# Patient Record
Sex: MALE | Race: WHITE | Employment: FULL TIME | URBAN - METROPOLITAN AREA
[De-identification: names, ages, dates, MRNs, and addresses within clinical notes are randomized per-mention and may not be internally consistent; named-entity substitution may affect disease eponyms.]

---

## 2017-05-10 ENCOUNTER — OFFICE VISIT (OUTPATIENT)
Dept: INTERNAL MEDICINE CLINIC | Age: 32
End: 2017-05-10

## 2017-05-10 VITALS
DIASTOLIC BLOOD PRESSURE: 84 MMHG | OXYGEN SATURATION: 98 % | HEART RATE: 82 BPM | SYSTOLIC BLOOD PRESSURE: 126 MMHG | TEMPERATURE: 98.4 F

## 2017-05-10 DIAGNOSIS — Z11.4 SCREENING FOR HIV WITHOUT PRESENCE OF RISK FACTORS: ICD-10-CM

## 2017-05-10 DIAGNOSIS — R07.9 CHEST PAIN, UNSPECIFIED TYPE: Primary | ICD-10-CM

## 2017-05-10 DIAGNOSIS — R07.9 CHEST PAIN, UNSPECIFIED TYPE: ICD-10-CM

## 2017-05-10 LAB
A/G RATIO: 2 (ref 1.1–2.2)
ALBUMIN SERPL-MCNC: 4.9 G/DL (ref 3.4–5)
ALP BLD-CCNC: 72 U/L (ref 40–129)
ALT SERPL-CCNC: 27 U/L (ref 10–40)
ANION GAP SERPL CALCULATED.3IONS-SCNC: 14 MMOL/L (ref 3–16)
AST SERPL-CCNC: 22 U/L (ref 15–37)
BILIRUB SERPL-MCNC: 0.6 MG/DL (ref 0–1)
BUN BLDV-MCNC: 15 MG/DL (ref 7–20)
CALCIUM SERPL-MCNC: 9.3 MG/DL (ref 8.3–10.6)
CHLORIDE BLD-SCNC: 98 MMOL/L (ref 99–110)
CHOLESTEROL, TOTAL: 233 MG/DL (ref 0–199)
CK MB: <1 NG/ML
CO2: 26 MMOL/L (ref 21–32)
CREAT SERPL-MCNC: 0.7 MG/DL (ref 0.9–1.3)
GFR AFRICAN AMERICAN: >60
GFR NON-AFRICAN AMERICAN: >60
GLOBULIN: 2.4 G/DL
GLUCOSE FASTING: 86 MG/DL (ref 70–99)
HCT VFR BLD CALC: 47.5 % (ref 40.5–52.5)
HDLC SERPL-MCNC: 50 MG/DL (ref 40–60)
HEMOGLOBIN: 15.5 G/DL (ref 13.5–17.5)
LDL CHOLESTEROL CALCULATED: 157 MG/DL
MCH RBC QN AUTO: 28.9 PG (ref 26–34)
MCHC RBC AUTO-ENTMCNC: 32.7 G/DL (ref 31–36)
MCV RBC AUTO: 88.5 FL (ref 80–100)
PDW BLD-RTO: 13.6 % (ref 12.4–15.4)
PLATELET # BLD: 187 K/UL (ref 135–450)
PMV BLD AUTO: 8.2 FL (ref 5–10.5)
POTASSIUM SERPL-SCNC: 4.5 MMOL/L (ref 3.5–5.1)
RBC # BLD: 5.37 M/UL (ref 4.2–5.9)
SODIUM BLD-SCNC: 138 MMOL/L (ref 136–145)
T4 FREE: 1.2 NG/DL (ref 0.9–1.8)
TOTAL PROTEIN: 7.3 G/DL (ref 6.4–8.2)
TRIGL SERPL-MCNC: 130 MG/DL (ref 0–150)
TSH REFLEX: 1.85 UIU/ML (ref 0.27–4.2)
VLDLC SERPL CALC-MCNC: 26 MG/DL
WBC # BLD: 4.5 K/UL (ref 4–11)

## 2017-05-10 PROCEDURE — 93000 ELECTROCARDIOGRAM COMPLETE: CPT | Performed by: NURSE PRACTITIONER

## 2017-05-10 PROCEDURE — 99203 OFFICE O/P NEW LOW 30 MIN: CPT | Performed by: NURSE PRACTITIONER

## 2017-05-10 ASSESSMENT — ENCOUNTER SYMPTOMS
SHORTNESS OF BREATH: 0
NAUSEA: 0
COUGH: 0
VOMITING: 0

## 2017-05-11 LAB
ESTIMATED AVERAGE GLUCOSE: 102.5 MG/DL
HBA1C MFR BLD: 5.2 %

## 2017-05-12 LAB — HIV-1 AND HIV-2 ANTIBODIES: NEGATIVE

## 2017-05-17 ENCOUNTER — HOSPITAL ENCOUNTER (OUTPATIENT)
Dept: NON INVASIVE DIAGNOSTICS | Age: 32
Discharge: HOME | End: 2017-05-17
Attending: NURSE PRACTITIONER | Admitting: NURSE PRACTITIONER

## 2017-05-17 DIAGNOSIS — R07.9 CHEST PAIN: ICD-10-CM

## 2017-11-15 ENCOUNTER — OFFICE VISIT (OUTPATIENT)
Dept: FAMILY MEDICINE CLINIC | Age: 32
End: 2017-11-15

## 2017-11-15 VITALS
HEART RATE: 67 BPM | RESPIRATION RATE: 20 BRPM | OXYGEN SATURATION: 98 % | TEMPERATURE: 98.6 F | DIASTOLIC BLOOD PRESSURE: 86 MMHG | HEIGHT: 72 IN | WEIGHT: 235.2 LBS | BODY MASS INDEX: 31.86 KG/M2 | SYSTOLIC BLOOD PRESSURE: 138 MMHG

## 2017-11-15 DIAGNOSIS — M54.2 NECK PAIN: Primary | ICD-10-CM

## 2017-11-15 DIAGNOSIS — E78.5 SERUM LIPIDS HIGH: ICD-10-CM

## 2017-11-15 DIAGNOSIS — Z23 NEED FOR PROPHYLACTIC VACCINATION AGAINST DIPHTHERIA-TETANUS-PERTUSSIS (DTP): ICD-10-CM

## 2017-11-15 PROCEDURE — 90471 IMMUNIZATION ADMIN: CPT | Performed by: FAMILY MEDICINE

## 2017-11-15 PROCEDURE — 99214 OFFICE O/P EST MOD 30 MIN: CPT | Performed by: FAMILY MEDICINE

## 2017-11-15 PROCEDURE — 90715 TDAP VACCINE 7 YRS/> IM: CPT | Performed by: FAMILY MEDICINE

## 2017-11-15 RX ORDER — DICLOFENAC SODIUM 75 MG/1
75 TABLET, DELAYED RELEASE ORAL 2 TIMES DAILY
Qty: 30 TABLET | Refills: 0 | Status: SHIPPED | OUTPATIENT
Start: 2017-11-15 | End: 2022-03-05 | Stop reason: ALTCHOICE

## 2017-11-15 RX ORDER — CYCLOBENZAPRINE HCL 10 MG
10 TABLET ORAL NIGHTLY PRN
Qty: 15 TABLET | Refills: 0 | Status: SHIPPED | OUTPATIENT
Start: 2017-11-15 | End: 2022-03-05 | Stop reason: ALTCHOICE

## 2017-11-15 ASSESSMENT — ENCOUNTER SYMPTOMS
ALLERGIC/IMMUNOLOGIC NEGATIVE: 1
SHORTNESS OF BREATH: 0
ABDOMINAL PAIN: 0
RESPIRATORY NEGATIVE: 1
CHEST TIGHTNESS: 0
COUGH: 0
EYES NEGATIVE: 1

## 2017-11-15 ASSESSMENT — PATIENT HEALTH QUESTIONNAIRE - PHQ9
SUM OF ALL RESPONSES TO PHQ9 QUESTIONS 1 & 2: 0
1. LITTLE INTEREST OR PLEASURE IN DOING THINGS: 0
2. FEELING DOWN, DEPRESSED OR HOPELESS: 0
SUM OF ALL RESPONSES TO PHQ QUESTIONS 1-9: 0

## 2017-11-15 NOTE — PROGRESS NOTES
Respiratory: Negative. Negative for cough, chest tightness and shortness of breath. Cardiovascular: Positive for chest pain. Negative for palpitations and leg swelling. Chest pain  8 month ago  Seen @[place] office  EKG done  Stress test done @[place]   Gastrointestinal: Negative for abdominal pain. Endocrine: Negative. Genitourinary: Negative for flank pain and frequency. Musculoskeletal: Positive for neck pain. Allergic/Immunologic: Negative. Neurological: Negative. Negative for dizziness, light-headedness, numbness and headaches. Hematological: Negative. Psychiatric/Behavioral: Negative. Objective:   Physical Exam   Constitutional: He is oriented to person, place, and time. He appears well-developed and well-nourished. No distress. HENT:   Head: Normocephalic. Right Ear: External ear normal.   Left Ear: External ear normal.   Nose: Nose normal.   Mouth/Throat: Oropharynx is clear and moist.   Eyes: Conjunctivae and EOM are normal. Pupils are equal, round, and reactive to light. Neck: Normal range of motion. Neck supple. Cardiovascular: Normal rate, regular rhythm and normal heart sounds. No murmur heard. Pulmonary/Chest: Effort normal and breath sounds normal.   Musculoskeletal: He exhibits no edema, tenderness or deformity. Neck +FROM  DTR 1/4    Neurological: He is alert and oriented to person, place, and time. He has normal reflexes. Skin: Skin is warm. He is not diaphoretic. Psychiatric: He has a normal mood and affect. His behavior is normal. Judgment and thought content normal.   Vitals reviewed. /86 (Site: Right Arm, Position: Sitting, Cuff Size: Medium Adult)   Pulse 67   Temp 98.6 °F (37 °C) (Oral)   Resp 20   Ht 5' 11.75\" (1.822 m)   Wt 235 lb 3.2 oz (106.7 kg)   SpO2 98%   BMI 32.12 kg/m²     Assessment:      1. Neck pain  diclofenac (VOLTAREN) 75 MG EC tablet    cyclobenzaprine (FLEXERIL) 10 MG tablet   2.  Need for prophylactic vaccination against diphtheria-tetanus-pertussis (DTP)  Tdap (age 10y-63y) IM (ADACEL)    DISCONTINUED: Tdap (ADACEL) 5-2-15.5 LF-MCG/0.5 injection   3.  Serum lipids high  Lipid Panel           Plan:      Rx given  Consider Physical therapy   He might get Chiropractor  Hx high lipid new order for repeat

## 2019-04-25 ENCOUNTER — TELEPHONE (OUTPATIENT)
Dept: INTERNAL MEDICINE CLINIC | Age: 34
End: 2019-04-25

## 2019-04-25 NOTE — TELEPHONE ENCOUNTER
Jaylyn Orlando is asking for the pt's EKG from 5/10/17  He has a 3:30 appt there today  Fax: [phone]

## 2022-01-09 ENCOUNTER — HOSPITAL ENCOUNTER (EMERGENCY)
Age: 37
Discharge: HOME | End: 2022-01-10
Attending: EMERGENCY MEDICINE
Payer: COMMERCIAL

## 2022-01-09 ENCOUNTER — APPOINTMENT (OUTPATIENT)
Dept: GENERAL RADIOLOGY | Age: 37
End: 2022-01-09
Payer: COMMERCIAL

## 2022-01-09 VITALS
TEMPERATURE: 98.4 F | RESPIRATION RATE: 16 BRPM | SYSTOLIC BLOOD PRESSURE: 158 MMHG | OXYGEN SATURATION: 98 % | HEART RATE: 68 BPM | DIASTOLIC BLOOD PRESSURE: 103 MMHG

## 2022-01-09 DIAGNOSIS — R07.9 CHEST PAIN, UNSPECIFIED TYPE: Primary | ICD-10-CM

## 2022-01-09 LAB
A/G RATIO: 1.4 (ref 1.1–2.2)
ALBUMIN SERPL-MCNC: 4.2 G/DL (ref 3.4–5)
ALP BLD-CCNC: 63 U/L (ref 40–129)
ALT SERPL-CCNC: 26 U/L (ref 10–40)
ANION GAP SERPL CALCULATED.3IONS-SCNC: 14 MMOL/L (ref 3–16)
AST SERPL-CCNC: 25 U/L (ref 15–37)
BASOPHILS ABSOLUTE: 0 K/UL (ref 0–0.2)
BASOPHILS RELATIVE PERCENT: 0.5 %
BILIRUB SERPL-MCNC: <0.2 MG/DL (ref 0–1)
BUN BLDV-MCNC: 16 MG/DL (ref 7–20)
CALCIUM SERPL-MCNC: 9.3 MG/DL (ref 8.3–10.6)
CHLORIDE BLD-SCNC: 102 MMOL/L (ref 99–110)
CO2: 20 MMOL/L (ref 21–32)
CREAT SERPL-MCNC: 0.7 MG/DL (ref 0.9–1.3)
EOSINOPHILS ABSOLUTE: 0.3 K/UL (ref 0–0.6)
EOSINOPHILS RELATIVE PERCENT: 4.1 %
GFR AFRICAN AMERICAN: >60
GFR NON-AFRICAN AMERICAN: >60
GLUCOSE BLD-MCNC: 84 MG/DL (ref 70–99)
HCT VFR BLD CALC: 45.1 % (ref 40.5–52.5)
HEMOGLOBIN: 15.3 G/DL (ref 13.5–17.5)
LYMPHOCYTES ABSOLUTE: 2.7 K/UL (ref 1–5.1)
LYMPHOCYTES RELATIVE PERCENT: 36.9 %
MCH RBC QN AUTO: 29.3 PG (ref 26–34)
MCHC RBC AUTO-ENTMCNC: 33.8 G/DL (ref 31–36)
MCV RBC AUTO: 86.7 FL (ref 80–100)
MONOCYTES ABSOLUTE: 0.7 K/UL (ref 0–1.3)
MONOCYTES RELATIVE PERCENT: 9.7 %
NEUTROPHILS ABSOLUTE: 3.6 K/UL (ref 1.7–7.7)
NEUTROPHILS RELATIVE PERCENT: 48.8 %
PDW BLD-RTO: 13 % (ref 12.4–15.4)
PLATELET # BLD: 223 K/UL (ref 135–450)
PMV BLD AUTO: 7.7 FL (ref 5–10.5)
POTASSIUM SERPL-SCNC: 4.5 MMOL/L (ref 3.5–5.1)
PRO-BNP: 15 PG/ML (ref 0–124)
RBC # BLD: 5.2 M/UL (ref 4.2–5.9)
SODIUM BLD-SCNC: 136 MMOL/L (ref 136–145)
TOTAL PROTEIN: 7.1 G/DL (ref 6.4–8.2)
TROPONIN: <0.01 NG/ML
TROPONIN: <0.01 NG/ML
WBC # BLD: 7.4 K/UL (ref 4–11)

## 2022-01-09 PROCEDURE — 36415 COLL VENOUS BLD VENIPUNCTURE: CPT

## 2022-01-09 PROCEDURE — 84484 ASSAY OF TROPONIN QUANT: CPT

## 2022-01-09 PROCEDURE — 93005 ELECTROCARDIOGRAM TRACING: CPT | Performed by: NURSE PRACTITIONER

## 2022-01-09 PROCEDURE — 85025 COMPLETE CBC W/AUTO DIFF WBC: CPT

## 2022-01-09 PROCEDURE — 80053 COMPREHEN METABOLIC PANEL: CPT

## 2022-01-09 PROCEDURE — 99285 EMERGENCY DEPT VISIT HI MDM: CPT

## 2022-01-09 PROCEDURE — 83880 ASSAY OF NATRIURETIC PEPTIDE: CPT

## 2022-01-09 PROCEDURE — 6370000000 HC RX 637 (ALT 250 FOR IP): Performed by: NURSE PRACTITIONER

## 2022-01-09 PROCEDURE — 71045 X-RAY EXAM CHEST 1 VIEW: CPT

## 2022-01-09 PROCEDURE — 93005 ELECTROCARDIOGRAM TRACING: CPT | Performed by: EMERGENCY MEDICINE

## 2022-01-09 RX ORDER — LISINOPRIL 10 MG/1
20 TABLET ORAL ONCE
Status: COMPLETED | OUTPATIENT
Start: 2022-01-09 | End: 2022-01-09

## 2022-01-09 RX ORDER — NITROGLYCERIN 0.4 MG/1
0.4 TABLET SUBLINGUAL ONCE
Status: COMPLETED | OUTPATIENT
Start: 2022-01-09 | End: 2022-01-09

## 2022-01-09 RX ADMIN — LIDOCAINE HYDROCHLORIDE: 20 SOLUTION ORAL; TOPICAL at 20:28

## 2022-01-09 RX ADMIN — NITROGLYCERIN 0.4 MG: 0.4 TABLET SUBLINGUAL at 19:38

## 2022-01-09 RX ADMIN — LISINOPRIL 20 MG: 10 TABLET ORAL at 19:38

## 2022-01-09 ASSESSMENT — ENCOUNTER SYMPTOMS
NAUSEA: 0
VOMITING: 0
ABDOMINAL PAIN: 0
SHORTNESS OF BREATH: 0
DIARRHEA: 0
CHEST TIGHTNESS: 0

## 2022-01-09 ASSESSMENT — PAIN SCALES - GENERAL
PAINLEVEL_OUTOF10: 5
PAINLEVEL_OUTOF10: 2

## 2022-01-09 ASSESSMENT — HEART SCORE: ECG: 0

## 2022-01-09 NOTE — LETTER
[facility] Emergency Department  [address] [facility], [address]             January 9, 2022    Patient: Marsha Valiente   YOB: 1985   Date of Visit: 1/9/2022       To Whom It May Concern:    Marsha Valiente was seen and treated in our emergency department on 1/9/2022. He may return to work on 1/12/2022.       Sincerely,         [facility] Emergency Room

## 2022-01-10 LAB
EKG ATRIAL RATE: 67 BPM
EKG ATRIAL RATE: 95 BPM
EKG DIAGNOSIS: NORMAL
EKG DIAGNOSIS: NORMAL
EKG P AXIS: 38 DEGREES
EKG P AXIS: 51 DEGREES
EKG P-R INTERVAL: 150 MS
EKG P-R INTERVAL: 154 MS
EKG Q-T INTERVAL: 358 MS
EKG Q-T INTERVAL: 392 MS
EKG QRS DURATION: 88 MS
EKG QRS DURATION: 92 MS
EKG QTC CALCULATION (BAZETT): 414 MS
EKG QTC CALCULATION (BAZETT): 449 MS
EKG R AXIS: 26 DEGREES
EKG R AXIS: 31 DEGREES
EKG T AXIS: 27 DEGREES
EKG T AXIS: 33 DEGREES
EKG VENTRICULAR RATE: 67 BPM
EKG VENTRICULAR RATE: 95 BPM

## 2022-01-10 PROCEDURE — 93010 ELECTROCARDIOGRAM REPORT: CPT | Performed by: INTERNAL MEDICINE

## 2022-01-10 NOTE — ED PROVIDER NOTES
This is an independent LAUREN patient encounter. I am available for consultation if needed. I did not perform a face-to-face evaluation of this patient and was not asked to see the patient. I was not made aware of any details of the patient's H&P or medical decision making but was asked to review and document this EKG. See my interpretation of the EKG below. I shared my findings and interpretation with the LAUREN for use in his/her independent management of this patient. See his/her note for details of the patient's history, physical, and all medical decision making.       The 12 lead EKG was interpreted by me as follows:  Rate: normal with a rate of 95  Rhythm: sinus  Axis: normal  Intervals: normal IN, narrow QRS, normal QTc  ST segments: no ST elevations or depressions  T waves: no abnormal inversions  Non-specific T wave changes: not present  Prior EKG comparison: No prior is currently available for comparison          Simona Swain MD  01/09/22 3596

## 2022-01-10 NOTE — ED NOTES
PT in bed resting, breathing easy, non-labored. No distress noted. On bedside tele monitoring.       Mary Roth RN  01/09/22 0930

## 2022-01-10 NOTE — ED NOTES
Pt cleared for discharge. Pt given all discharge and follow-up instructions verbally and in writing. Pt verbalized understanding of all instructions. Pt left care area without incident.       Lyda Schirmer, RN  01/09/22 6642

## 2022-01-10 NOTE — ED NOTES
Bed: 09  Expected date:   Expected time:   Means of arrival:   Comments:  meena Hunt RN  01/09/22 1932

## 2022-01-10 NOTE — ED PROVIDER NOTES
[facility]    Physician Attestation    Pt Name: Benedict Ham  MRN: [id number]  Armstrongfjuan 1985  Date of evaluation: 1/9/22        Physician Note:    I havepersonally performed and/or participated in the history, exam and medical decision making and agree with all pertinent clinical information. I have also reviewed and agree with the past medical, family and social historyunless otherwise noted. I have personally performed a face to face diagnostic evaluation onthis patient. I have reviewed the mid-levels findings and agree. History: Chest pressure right parasternal most of the day afternoon till seen in the ER and given nitroglycerin no radiation no associated dyspnea diaphoresis      REVIEW OF SYSTEMS    Constitutional:  Denies fever, chills, or weakness   Eyes:  Denies vision changes  HENT:  Denies sore throat or neck pain   Respiratory:  Denies cough or shortness of breath   Cardiovascular:  chest pain  GI:  Denies abdominal pain, nausea, vomiting, or diarrhea   Musculoskeletal:  Denies back pain   Skin: no rash or vesicles   Neurologic:  no headache weakness focal    Lymphatic:  no swollen  nodes   Psychiatric: no si or hs thoughts     All systems negative except as marked. General Appearance:  Alert, cooperative, no distress, appears stated age. Head:  Normocephalic, without obvious abnormality, atraumatic. Eyes:  conjunctiva/corneas clear, EOM's intact. Sclera anicteric. ENT: Mucous membranes moist.   Neck: Supple, symmetrical, trachea midline, no adenopathy. No jugular venous distention. Lungs:   No Respiratory Distress. no rales  rhonchi rub   Chest Wall:  Nontender  no deformity   Heart:  Rsr no murmer gallop    Abdomen:   Soft nontender no organomegally    Extremities:  Full range of motion. no deformity   Pulses: Equal  upper and lower    Skin:  No rashes or lesions to exposed skin.    Neurologic: Alert and oriented X 3. Motor grossly normal.  Speech clear. Cr n 2-12 intact   EKG Interpretation    Interpreted by emergency department physician    Rhythm: normal sinus   Rate: normal  Axis: normal  Ectopy: none  Conduction: normal  ST Segments: no acute change  T Waves: no acute change  Q Waves: none    Clinical Impression: Normal sinus rhythm    Boris Tesfaye MD  EKG Interpretation    Interpreted by emergency department physician    Rhythm: normal sinus   Rate: normal  Axis: normal  Ectopy: none  Conduction: normal  ST Segments: no acute change  T Waves: no acute change  Q Waves: none and aVf    Clinical Impression: Sinus rhythm  Boris Tesfaye MD  Heart score of 3 patient is pain-free    1. Chest pain, unspecified type          DISPOSITION/PLAN  PATIENT REFERRED TO:  Claudia Fitzgerald MD  [address] [place] [address],[address] [id number] [phone]    Schedule an appointment as soon as possible for a visit   call in the morning for follow up appointment    Kay Romero MD  [address]  [address] [place] [address]          DISCHARGE MEDICATIONS:  Discharge Medication List as of 1/9/2022 11:37 PM            MD Boris Quintero MD  01/10/22 7252

## 2022-01-12 NOTE — PROGRESS NOTES
[address]   Cardiac Consultation    Referring Provider:  Russ Taveras MD     Chief Complaint   Patient presents with    Chest Pain    New Patient      History of Present Illness:  Mr. Harlene Meigs is here after an ER visit 22 for chest pain. He has had pain in the past with a negative stress test. This occasion, his B/P was elevated; he took 2 adult asa. He was due for his Lisinopril at the time. He was given NTG with minimal relief followed by a GI cocktail. EKG stable and troponins were negative. He was discharged in stable condition with an order for a stress test, not scheduled yet. He is a former smoker. He is treated for hypertension and hyperlipidemia. He's had both Covid vaccines, no booster yet. His father had a heart attack age 28, and his grandfather has heart disease. His mother  in her 42's from CHF    Today, he reports upper chest pain that feels like \"something digging in my chest\"; sometimes there is a burning sensation. He has no symptoms with exertional efforts. He denies VANN/PND, palpitations, light-headedness, edema. He quit smoking 3-4 years ago. Past Medical History:  hyperlipidemia    Surgical History:   has no past surgical history on file. Social History:   reports that he quit smoking about 7 years ago. He has a 3.50 pack-year smoking history. He has never used smokeless tobacco. He reports current alcohol use. He reports that he does not use drugs. Family History:  family history includes Heart Attack in his father; Other in his maternal grandfather, mother, and paternal grandfather. Home Medications:  Prior to Admission medications    Medication Sig Start Date End Date Taking?  Authorizing Provider   rosuvastatin (CRESTOR) 10 MG tablet Take 10 mg by mouth daily 21  Yes Historical Provider, MD   lisinopril (PRINIVIL;ZESTRIL) 20 MG tablet Take 20 mg by mouth daily 21  Yes Historical Provider, MD   diclofenac (VOLTAREN) 75 MG EC tablet Take 1 tablet by mouth 2 times daily 11/15/17  Yes Lars Meneses MD   cyclobenzaprine (FLEXERIL) 10 MG tablet Take 1 tablet by mouth nightly as needed for Muscle spasms 11/15/17  Yes Lars Meneses MD        Allergies:  Patient has no known allergies. Review of Systems:   · Constitutional: there has been no unanticipated weight loss. There's been no change in energy level, sleep pattern, or activity level. · Eyes: No visual changes or diplopia. No scleral icterus. · ENT: No Headaches, hearing loss or vertigo. No mouth sores or sore throat. · Cardiovascular: Reviewed in HPI  · Respiratory: No cough or wheezing, no sputum production. No hematemesis. · Gastrointestinal: No abdominal pain, appetite loss, blood in stools. No change in bowel or bladder habits. · Genitourinary: No dysuria, trouble voiding, or hematuria. · Musculoskeletal:  No gait disturbance, weakness or joint complaints. · Integumentary: No rash or pruritis. · Neurological: No headache, diplopia, change in muscle strength, numbness or tingling. No change in gait, balance, coordination, mood, affect, memory, mentation, behavior. · Psychiatric: No anxiety, no depression. · Endocrine: No malaise, fatigue or temperature intolerance. No excessive thirst, fluid intake, or urination. No tremor. · Hematologic/Lymphatic: No abnormal bruising or bleeding, blood clots or swollen lymph nodes. · Allergic/Immunologic: No nasal congestion or hives.     Physical Examination:    Vitals:    01/17/22 1354   BP: 128/82   Pulse: 81   SpO2: 98%        Wt Readings from Last 1 Encounters:   01/17/22 233 lb 9.6 oz (106 kg)     Constitutional and General Appearance: Appears stated age, NAD  Skin:good turgor,intact without lesions  HEENT: EOMI ,normal  Neck:no JVD    Respiratory:  · Normal excursion and expansion without use of accessory muscles  · Resp Auscultation: Normal breath sounds without dullness  Cardiovascular:  · The apical impulses not displaced  · Heart tones are crisp and normal  · Cervical veins are not engorged  · The carotid upstroke is normal in amplitude and contour without delay or bruit  · Peripheral pulses are symmetrical and full  · There is no clubbing, cyanosis of the extremities. · No edema  · Femoral Arteries: 2+ and equal  · Pedal Pulses: 2+ and equal   Abdomen:  · No masses or tenderness  · Liver/Spleen: No Abnormalities Noted  Neurological/Psychiatric:  · Alert and oriented in all spheres  · Moves all extremities well  · Exhibits normal gait balance and coordination  · No abnormalities of mood, affect, memory, mentation, or behavior are noted    Plain GXT 2017  Summary   Normal stress EKG. Assessment:       Chest pain, atypical  No exacerbating factors. B/P elevated at the time> was due for B/P med. Seen in ER 1/9/22> given NTG & GI cocktail. [address] heart attack in father age 28. EKG 1/9/21 (ER)> NSR 67  EKG today 1/17/22 (read & interpreted by me)> NSR 75, LA enlargement  Plain GXT today 1/17/22 was normal  Can try Advil 600mg bid x 2 weeks    Hypertension  Stable on Lisinopril  Blood pressure 128/82, pulse 81, height 6' (1.829 m), weight 233 lb 9.6 oz (106 kg), SpO2 98 %. Hyperlipidemia  Managed per PCP  Stable on rosuvastatin  2/15/21> , , HDL 44, LDL 88      PLAN:  1. Take Advil or ibuprofen 600mg bid x 2 weeks with food to aid chest discomfort  2. Coronary calcium score soon (FF or Proscan)  3. Return for follow up in 2 months    I appreciate the opportunity of cooperating in the care of this individual.    Henrietta Lewis M.D., [facility]    Patient's problem list, medications, allergies, past medical, surgical, social and family histories were reviewed and updated as appropriate. Scribe's attestation: This note was scribed in the presence of Dr. Sony Lewis MD, by Brigitte Wolff RN. The scribe's documentation has been prepared under my direction and personally reviewed by me in its entirety.  I confirm that the note above accurately

## 2022-01-17 ENCOUNTER — PROCEDURE VISIT (OUTPATIENT)
Dept: CARDIOLOGY CLINIC | Age: 37
End: 2022-01-17
Payer: COMMERCIAL

## 2022-01-17 ENCOUNTER — OFFICE VISIT (OUTPATIENT)
Dept: CARDIOLOGY CLINIC | Age: 37
End: 2022-01-17
Payer: COMMERCIAL

## 2022-01-17 VITALS
BODY MASS INDEX: 31.64 KG/M2 | HEIGHT: 72 IN | WEIGHT: 233.6 LBS | HEART RATE: 81 BPM | DIASTOLIC BLOOD PRESSURE: 82 MMHG | OXYGEN SATURATION: 98 % | SYSTOLIC BLOOD PRESSURE: 128 MMHG

## 2022-01-17 DIAGNOSIS — R07.9 CHEST PAIN, UNSPECIFIED TYPE: ICD-10-CM

## 2022-01-17 DIAGNOSIS — E78.49 OTHER HYPERLIPIDEMIA: ICD-10-CM

## 2022-01-17 DIAGNOSIS — R07.9 CHEST PAIN, UNSPECIFIED TYPE: Primary | ICD-10-CM

## 2022-01-17 PROCEDURE — 93015 CV STRESS TEST SUPVJ I&R: CPT | Performed by: INTERNAL MEDICINE

## 2022-01-17 PROCEDURE — G8484 FLU IMMUNIZE NO ADMIN: HCPCS | Performed by: INTERNAL MEDICINE

## 2022-01-17 PROCEDURE — G8427 DOCREV CUR MEDS BY ELIG CLIN: HCPCS | Performed by: INTERNAL MEDICINE

## 2022-01-17 PROCEDURE — G8417 CALC BMI ABV UP PARAM F/U: HCPCS | Performed by: INTERNAL MEDICINE

## 2022-01-17 PROCEDURE — 99204 OFFICE O/P NEW MOD 45 MIN: CPT | Performed by: INTERNAL MEDICINE

## 2022-01-17 PROCEDURE — 1036F TOBACCO NON-USER: CPT | Performed by: INTERNAL MEDICINE

## 2022-01-17 PROCEDURE — 93000 ELECTROCARDIOGRAM COMPLETE: CPT | Performed by: INTERNAL MEDICINE

## 2022-01-17 RX ORDER — ROSUVASTATIN CALCIUM 10 MG/1
10 TABLET, COATED ORAL DAILY
COMMUNITY
Start: 2021-12-09

## 2022-01-17 RX ORDER — LISINOPRIL 20 MG/1
20 TABLET ORAL DAILY
COMMUNITY
Start: 2021-12-09

## 2022-02-10 ENCOUNTER — HOSPITAL ENCOUNTER (OUTPATIENT)
Dept: CT IMAGING | Age: 37
Discharge: HOME | End: 2022-02-10
Payer: COMMERCIAL

## 2022-02-10 DIAGNOSIS — E78.49 OTHER HYPERLIPIDEMIA: ICD-10-CM

## 2022-02-10 PROCEDURE — 75571 CT HRT W/O DYE W/CA TEST: CPT

## 2022-03-05 ENCOUNTER — HOSPITAL ENCOUNTER (EMERGENCY)
Age: 37
Discharge: HOME | End: 2022-03-05
Payer: COMMERCIAL

## 2022-03-05 VITALS
DIASTOLIC BLOOD PRESSURE: 83 MMHG | BODY MASS INDEX: 32.48 KG/M2 | WEIGHT: 232 LBS | RESPIRATION RATE: 14 BRPM | HEIGHT: 71 IN | TEMPERATURE: 98.2 F | OXYGEN SATURATION: 99 % | HEART RATE: 99 BPM | SYSTOLIC BLOOD PRESSURE: 170 MMHG

## 2022-03-05 DIAGNOSIS — S39.012A STRAIN OF LUMBAR REGION, INITIAL ENCOUNTER: Primary | ICD-10-CM

## 2022-03-05 DIAGNOSIS — M54.32 SCIATICA OF LEFT SIDE: ICD-10-CM

## 2022-03-05 PROCEDURE — 96372 THER/PROPH/DIAG INJ SC/IM: CPT

## 2022-03-05 PROCEDURE — 6370000000 HC RX 637 (ALT 250 FOR IP): Performed by: NURSE PRACTITIONER

## 2022-03-05 PROCEDURE — 99281 EMR DPT VST MAYX REQ PHY/QHP: CPT

## 2022-03-05 PROCEDURE — 6360000002 HC RX W HCPCS: Performed by: NURSE PRACTITIONER

## 2022-03-05 RX ORDER — LIDOCAINE 4 G/G
1 PATCH TOPICAL DAILY
Qty: 30 PATCH | Refills: 0 | Status: SHIPPED | OUTPATIENT
Start: 2022-03-05 | End: 2022-03-29

## 2022-03-05 RX ORDER — CYCLOBENZAPRINE HCL 10 MG
10 TABLET ORAL 3 TIMES DAILY PRN
Qty: 21 TABLET | Refills: 0 | Status: SHIPPED | OUTPATIENT
Start: 2022-03-05 | End: 2022-03-15

## 2022-03-05 RX ORDER — KETOROLAC TROMETHAMINE 30 MG/ML
30 INJECTION, SOLUTION INTRAMUSCULAR; INTRAVENOUS ONCE
Status: COMPLETED | OUTPATIENT
Start: 2022-03-05 | End: 2022-03-05

## 2022-03-05 RX ORDER — LIDOCAINE 4 G/G
1 PATCH TOPICAL ONCE
Status: DISCONTINUED | OUTPATIENT
Start: 2022-03-05 | End: 2022-03-05 | Stop reason: HOSPADM

## 2022-03-05 RX ADMIN — KETOROLAC TROMETHAMINE 30 MG: 30 INJECTION, SOLUTION INTRAMUSCULAR; INTRAVENOUS at 17:14

## 2022-03-05 ASSESSMENT — PAIN SCALES - GENERAL
PAINLEVEL_OUTOF10: 6
PAINLEVEL_OUTOF10: 6

## 2022-03-05 ASSESSMENT — PAIN - FUNCTIONAL ASSESSMENT: PAIN_FUNCTIONAL_ASSESSMENT: 0-10

## 2022-03-05 ASSESSMENT — PAIN DESCRIPTION - LOCATION: LOCATION: BACK

## 2022-03-05 ASSESSMENT — PAIN DESCRIPTION - DESCRIPTORS: DESCRIPTORS: SHARP

## 2022-03-05 ASSESSMENT — PAIN DESCRIPTION - PAIN TYPE: TYPE: ACUTE PAIN

## 2022-03-05 ASSESSMENT — PAIN DESCRIPTION - ONSET: ONSET: GRADUAL

## 2022-03-05 ASSESSMENT — PAIN DESCRIPTION - ORIENTATION: ORIENTATION: LEFT

## 2022-03-05 ASSESSMENT — PAIN DESCRIPTION - FREQUENCY: FREQUENCY: CONTINUOUS

## 2022-03-05 NOTE — Clinical Note
Marychuy Ulrich was seen and treated in our emergency department on 3/5/2022. He may return to work on 03/08/2022. If you have any questions or concerns, please don't hesitate to call.       Hugh Kuo, STEFAN - CNP

## 2022-03-05 NOTE — ED PROVIDER NOTES
[facility]        Pt Name: Noelle Flores  MRN: [id number]  [place] 1985  Date of evaluation: 3/5/2022  Provider: STEFAN Wolfe - KD  PCP: Cody Portillo MD  Note Started: 4:36 PM EST       LAUREN. I have evaluated this patient. My supervising physician was available for consultation. CHIEF COMPLAINT       Chief Complaint   Patient presents with    Back Pain     pt states lower back pain that goes down left leg for 2-3 weeks       HISTORY OF PRESENT ILLNESS   (Location, Timing/Onset, Context/Setting, Quality, Duration, Modifying Factors, Severity, Associated Signs and Symptoms)  Note limiting factors. Chief Complaint: back pain     Noelle Flores is a 40 y.o. male medical history of hypertension hyperlipidemia presents emergency department with complaints of low back pain to the left paraspinous radiates into the left lower extremity for the past many months. Stated to flareup over the past few weeks and has been getting worse. Seems worse with position changes, prolonged sitting and standing. Excellent relief of pain when he is lying flat supine with his left knee flexed to remove pressure off his lower back. He does have a longstanding history of chronic low back pain, however does have a previous history of radiculopathy or sciatica. He took a dose of Motrin this morning that usually alleviates his low back pain, however did not work today and therefore presented to the emergency department. Reports attributed to him being overweight and it was pulling on his abdominal region and causing pain into his low back. Today notes it radiates into his left lower extremity. Denies any additional trauma, accidents, injuries, or falls. 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 He denies any known work-related injury.   He denies any associated loss of bowel or bladder function, cough, fever, lightheaded, dizzy, syncope, leg swelling, nausea, vomiting, diarrhea, chest pain, cough, wheezing, headache, neck pain, or urinary symptoms. Former smoker, denies alcohol use, denies street drugs or IVDA. Nursing Notes were all reviewed and agreed with or any disagreements were addressed in the HPI. REVIEW OF SYSTEMS    (2-9 systems for level 4, 10 or more for level 5)     Review of Systems    Positives and Pertinent negatives as per HPI. Except as noted above in the ROS, all other systems were reviewed and negative. PAST MEDICAL HISTORY     Past Medical History:   Diagnosis Date    Hyperlipidemia     Hypertension          SURGICAL HISTORY   History reviewed. No pertinent surgical history. [address]       Discharge Medication List as of 3/5/2022  5:17 PM      CONTINUE these medications which have NOT CHANGED    Details   rosuvastatin (CRESTOR) 10 MG tablet Take 10 mg by mouth dailyHistorical Med      lisinopril (PRINIVIL;ZESTRIL) 20 MG tablet Take 20 mg by mouth dailyHistorical Med               ALLERGIES     Patient has no known allergies. FAMILYHISTORY       Family History   Problem Relation Age of Onset    Other Mother         @age 50 Pneumonia    Heart Attack Father         @age 35 alive 53 yo    Other Maternal Grandfather         no contact    Other Paternal Grandfather         no contact          SOCIAL HISTORY       Social History     Tobacco Use    Smoking status: Former Smoker     Packs/day: 0.50     Years: 7.00     Pack years: 3.50     Quit date: 8/15/2014     Years since quittin.5    Smokeless tobacco: Never Used   Vaping Use    Vaping Use: Former   Substance Use Topics    Alcohol use:  Yes    Drug use: No       SCREENINGS             PHYSICAL EXAM    (up to 7 for level 4, 8 or more for level 5)     ED Triage Vitals [22 1633]   BP Temp Temp Source Pulse Resp SpO2 Height Weight   (!) 170/83 98.2 °F (36.8 °C) Oral 99 14 99 % 5' 11\" (1.803 m) 232 lb (105.2 kg)       Physical Exam  Vitals and nursing note reviewed. Constitutional:       General: He is awake. Appearance: Normal appearance. He is well-developed. He is obese. HENT:      Head: Normocephalic and atraumatic. Nose: Nose normal.   Eyes:      General:         Right eye: No discharge. Left eye: No discharge. Cardiovascular:      Rate and Rhythm: Normal rate and regular rhythm. Pulses:           Dorsalis pedis pulses are 2+ on the right side and 2+ on the left side. Heart sounds: Normal heart sounds. Pulmonary:      Effort: Pulmonary effort is normal. No respiratory distress. Breath sounds: Normal breath sounds. Abdominal:      General: Bowel sounds are normal.      Palpations: Abdomen is soft. Tenderness: There is no abdominal tenderness. There is no right CVA tenderness or left CVA tenderness. Musculoskeletal:      Cervical back: Full passive range of motion without pain and normal range of motion. No spinous process tenderness or muscular tenderness. Thoracic back: No tenderness or bony tenderness. Lumbar back: Tenderness (left paraspinous) present. No bony tenderness. Positive left straight leg raise test. Negative right straight leg raise test.      Right lower leg: No edema. Left lower leg: No edema. Skin:     General: Skin is warm and dry. Coloration: Skin is not pale. Neurological:      General: No focal deficit present. Mental Status: He is alert and oriented to person, place, and time. Sensory: Sensation is intact. Motor: Motor function is intact. Coordination: Coordination is intact. Gait: Gait is intact. Comments: Pedal pushes 5/5 bilateral   Psychiatric:         Behavior: Behavior normal. Behavior is cooperative. DIAGNOSTIC RESULTS   LABS:    Labs Reviewed - No data to display    When ordered only abnormal lab results are displayed.  All other labs were within normal range or not returned as of this dictation. EKG: When ordered, EKG's are interpreted by the Emergency Department Physician in the absence of a cardiologist.  Please see their note for interpretation of EKG. RADIOLOGY:   Non-plain film images such as CT, Ultrasound and MRI are read by the radiologist. Plain radiographic images are visualized and preliminarily interpreted by the ED Provider with the below findings:        Interpretation per the Radiologist below, if available at the time of this note:    No orders to display     No results found. PROCEDURES   Unless otherwise noted below, none     Procedures    CRITICAL CARE TIME       CONSULTS:  None      EMERGENCY DEPARTMENT COURSE and DIFFERENTIAL DIAGNOSIS/MDM:   Vitals:    Vitals:    03/05/22 1633   BP: (!) 170/83   Pulse: 99   Resp: 14   Temp: 98.2 °F (36.8 °C)   TempSrc: Oral   SpO2: 99%   Weight: 232 lb (105.2 kg)   Height: 5' 11\" (1.803 m)       Patient was given the following medications:  Medications   lidocaine 4 % external patch 1 patch (1 patch TransDERmal Patch Applied 3/5/22 1714)   ketorolac (TORADOL) injection 30 mg (30 mg IntraMUSCular Given 3/5/22 1714)           Care of this patient took place during the COVID-19 pandemic emergency. ED COURSE & MEDICAL DECISION MAKING    - The patient presented to the ER with complaints of back pain. Vital signs were reviewed. Exam well-developed, well-nourished male who appears uncomfortable. - Pertinent Labs & Imaging studies reviewed. (See chart for details)   -  Patient seen and evaluated in the emergency department. -  Triage and nursing notes reviewed and incorporated. -  Old chart records reviewed and incorporated. -  LAUREN. I have evaluated this patient.   My supervising physician was available for consultation.  -  Differential diagnosis includes:  osteomyelitis, discitis, spinal epidural abscess, fracture (traumatic or pathologic), disc herniation with cord compression, spinal metastasis with cord compression, ruptured abdominal aortic aneurysm [AAA], retroperitoneal bleed, spinal epidural hematoma, vs COVID-19  -  Work-up included:  See above  -  ED treatment included:  toradol, lidocaine  -  Results discussed with patient. Panda Garcia is a 40year old female with c/o lower back pain into left paraspinous for the past many months, worse the past few weeks. He has a history of prior low back pain that was attributed to poor body posture and being overweight. He denies any back injury that worsen his symptoms currently. He took a dose of Motrin this morning that usually helps alleviate his symptoms, however did not work today and therefore presented to emergency department for further evaluation. On exam he is tender to the lumbar region to the left paraspinous. No bilateral CVA tenderness noted. Abdomen soft nontender. Neurovascular status intact with no decreased sensation to the lower extremities, no foot drop noted. Pedal pushes 5/5. SLR to rt neg, left pos at 45 degrees. DP pulse 2+ bilateral.  Patient ambulated to the the room without difficulty noted. Psychiatric the emergency department today. I offered IM Toradol and a lidocaine patch. He was instructed on home treatment care. He was given strict return discharge instructions. Shared decision making is complete and patient is stable for discharge at this time    I completed a structured, evidence-based clinical evaluation to screen for acute non-traumatic spinal emergencies. The patient has a normal detailed neurologic exam and a low red flag score. The evidence indicates that the patient is very low risk for an acute spinal emergency and this is consistent with my clinical intuition. The risk of further workup is higher than the likelihood of the patient having a spinal epidural abscess or other dangerous emergency spinal condition.  It is, therefore, in the patients best interest not to do additional emergent testing at this time. I have discussed with the patient my clinical impression and the result of an evidence-based clinical evaluation to screen for spinal epidural abscess and other spinal emergencies, as well as the risk of further testing and hospitalization. The evidence shows that the risk for an acute spinal emergency is less than 1%. Although the risk of an acute spinal emergency has not been completely eliminated, the risks of further testing likely exceed any potential benefit, and the patient agrees with not pursuing further emergent evaluation for causes of back pain at this time. FINAL IMPRESSION      1. Strain of lumbar region, initial encounter    2.  Sciatica of left side          DISPOSITION/PLAN   DISPOSITION        PATIENT REFERRED TO:  88 Zoraida Newell MD  [address] [place] [address]  [phone]    Call in 2 days  As needed, If symptoms worsen    [facility] Emergency Department  [address] [place] [address]  [phone]  Go to   As needed      DISCHARGE MEDICATIONS:  Discharge Medication List as of 3/5/2022  5:17 PM      START taking these medications    Details   cyclobenzaprine (FLEXERIL) 10 MG tablet Take 1 tablet by mouth 3 times daily as needed for Muscle spasms, Disp-21 tablet, R-0Print      lidocaine 4 % external patch Place 1 patch onto the skin daily, TransDERmal, DAILY Starting Sat 3/5/2022, Until Mon 4/4/2022, For 30 days, Disp-30 patch, R-0, Print             DISCONTINUED MEDICATIONS:  Discharge Medication List as of 3/5/2022  5:17 PM                 (Please note that portions of this note were completed with a voice recognition program.  Efforts were made to edit the dictations but occasionally words are mis-transcribed.)    STEFAN Yao CNP (electronically signed)            STEFAN Yao CNP  03/05/22 4827

## 2022-03-27 NOTE — PROGRESS NOTES
[facility]   Cardiac Consultation    Referring Provider:  Anna Underwood MD     Chief Complaint   Patient presents with    Hyperlipidemia    Follow-up     2 mo        History of Present Illness:  Mr. Skip Almaraz is here for two month follow up visit on his history of chest pain. He was seen at [facility] ER on 22 for chest pain. He has had pain in the past with a negative stress test. This occasion, his B/P was elevated; he took 2 adult asa. He was due for his Lisinopril at the time. He was given NTG with minimal relief followed by a GI cocktail. EKG stable and troponins were negative. He was discharged in stable condition. Exercise treadmill test on 22 was normal.  He is a former smoker. He is treated for hypertension and hyperlipidemia. He's had both Covid vaccines, no booster yet. His father had a heart attack age 28, and his grandfather has heart disease. His mother  in her 42's from CHF. 2/10/22 CT cardiac calcium scoring : Agatston score = 0     Today, he is doing well from the cardiac standpoint. He denies exertional chest pain, shortness of breath, dizziness, palpitations, or edema. He is tolerating current medications without side effects. Past Medical History:  hyperlipidemia    Surgical History:   has no past surgical history on file. Social History:   reports that he quit smoking about 7 years ago. He has a 3.50 pack-year smoking history. He has never used smokeless tobacco. He reports current alcohol use. He reports that he does not use drugs. Family History:  family history includes Heart Attack in his father; Other in his maternal grandfather, mother, and paternal grandfather. Home Medications:  Prior to Admission medications    Medication Sig Start Date End Date Taking?  Authorizing Provider   diclofenac (VOLTAREN) 50 MG EC tablet Take 1 tablet by mouth 2 times daily for 20 doses 3/5/22 3/29/22 Yes STEFAN Wheeler - KD   rosuvastatin (CRESTOR) 10 MG tablet Take 10 mg by mouth daily 12/9/21  Yes Historical Provider, MD   lisinopril (PRINIVIL;ZESTRIL) 20 MG tablet Take 20 mg by mouth daily 12/9/21  Yes Historical Provider, MD        Allergies:  Patient has no known allergies. Review of Systems:   · Constitutional: there has been no unanticipated weight loss. There's been no change in energy level, sleep pattern, or activity level. · Eyes: No visual changes or diplopia. No scleral icterus. · ENT: No Headaches, hearing loss or vertigo. No mouth sores or sore throat. · Cardiovascular: Reviewed in HPI  · Respiratory: No cough or wheezing, no sputum production. No hematemesis. · Gastrointestinal: No abdominal pain, appetite loss, blood in stools. No change in bowel or bladder habits. · Genitourinary: No dysuria, trouble voiding, or hematuria. · Musculoskeletal:  No gait disturbance, weakness or joint complaints. · Integumentary: No rash or pruritis. · Neurological: No headache, diplopia, change in muscle strength, numbness or tingling. No change in gait, balance, coordination, mood, affect, memory, mentation, behavior. · Psychiatric: No anxiety, no depression. · Endocrine: No malaise, fatigue or temperature intolerance. No excessive thirst, fluid intake, or urination. No tremor. · Hematologic/Lymphatic: No abnormal bruising or bleeding, blood clots or swollen lymph nodes. · Allergic/Immunologic: No nasal congestion or hives.     Physical Examination:    Vitals:    03/29/22 0749   BP: 120/72   Pulse: 85   SpO2: 98%        Wt Readings from Last 1 Encounters:   03/29/22 229 lb 14.4 oz (104.3 kg)     Constitutional and General Appearance: Appears stated age, NAD  Skin:good turgor,intact without lesions  HEENT: EOMI ,normal  Neck:no JVD    Respiratory:  · Normal excursion and expansion without use of accessory muscles  · Resp Auscultation: Normal breath sounds without dullness  Cardiovascular:  · The apical impulses not displaced  · Heart tones are crisp and normal  · Cervical veins are not engorged  · The carotid upstroke is normal in amplitude and contour without delay or bruit  · Peripheral pulses are symmetrical and full  · There is no clubbing, cyanosis of the extremities. · No edema  · Femoral Arteries: 2+ and equal  · Pedal Pulses: 2+ and equal   Abdomen:  · No masses or tenderness  · Liver/Spleen: No Abnormalities Noted  Neurological/Psychiatric:  · Alert and oriented in all spheres  · Moves all extremities well  · Exhibits normal gait balance and coordination  · No abnormalities of mood, affect, memory, mentation, or behavior are noted    Plain GXT 2017  Summary   Normal stress EKG. Plain GXT 1/17/22:    Summary    Normal treadmill exercise test.     CT cardiac calcium scoring 2/10/22  Total Agatston calcium score of zero (0) implies a very low likelihood of a cardiac event over at least the next 3 years. No incidental clinically important extracardiac CT findings. Assessment:       Chest pain, atypical  No exacerbating factors. B/P elevated at the time> was due for B/P med. Seen in ER 1/9/22> given NTG & GI cocktail. [address] heart attack in father age 28. EKG 1/9/21 (ER)> NSR 67  EKG1/17/22 (read & interpreted by me)> NSR 75, LA enlargement  Plain GXT  1/17/22 was normal  CT cardiac calcium scoring 2/10/22 :  Agatston score = 0  No exertional chest pain. Has intermittent atypical chest discomfort (noncardiac)    Hypertension  Stable on Lisinopril   Blood pressure 120/72, pulse 85, height 5' 11\" (1.803 m), weight 229 lb 14.4 oz (104.3 kg), SpO2 98 %. Hyperlipidemia  Managed per PCP  Stable on rosuvastatin   2/15/21> , , HDL 44, LDL 88      PLAN:  Cardiac test and lab results personally reviewed by me during this office visit and discussed.    Symptoms seem musculoskeletal in etiology-noncardiac    Recommend continuation of risk factor management  Follow up with cardiology on an as needed basis      I appreciate the opportunity of cooperating in the care of this individual.    Leopoldo Rye. Teresa Dennis M.D., [facility]    Patient's problem list, medications, allergies, past medical, surgical, social and family histories were reviewed and updated as appropriate. Scribe's attestation: This note was scribed in the presence of Flory Dennis M.D. by Ankita Lopez RN    The scribe's documentation has been prepared under my direction and personally reviewed by me in its entirety. I confirm that the note above accurately reflects all work, treatment, procedures, and medical decision making performed by me.

## 2022-03-29 ENCOUNTER — OFFICE VISIT (OUTPATIENT)
Dept: CARDIOLOGY CLINIC | Age: 37
End: 2022-03-29
Payer: COMMERCIAL

## 2022-03-29 VITALS
WEIGHT: 229.9 LBS | HEART RATE: 85 BPM | OXYGEN SATURATION: 98 % | SYSTOLIC BLOOD PRESSURE: 120 MMHG | HEIGHT: 71 IN | BODY MASS INDEX: 32.19 KG/M2 | DIASTOLIC BLOOD PRESSURE: 72 MMHG

## 2022-03-29 DIAGNOSIS — E78.49 OTHER HYPERLIPIDEMIA: ICD-10-CM

## 2022-03-29 DIAGNOSIS — I10 PRIMARY HYPERTENSION: ICD-10-CM

## 2022-03-29 DIAGNOSIS — R07.9 CHEST PAIN, UNSPECIFIED TYPE: Primary | ICD-10-CM

## 2022-03-29 PROCEDURE — G8427 DOCREV CUR MEDS BY ELIG CLIN: HCPCS | Performed by: INTERNAL MEDICINE

## 2022-03-29 PROCEDURE — 1036F TOBACCO NON-USER: CPT | Performed by: INTERNAL MEDICINE

## 2022-03-29 PROCEDURE — G8484 FLU IMMUNIZE NO ADMIN: HCPCS | Performed by: INTERNAL MEDICINE

## 2022-03-29 PROCEDURE — G8417 CALC BMI ABV UP PARAM F/U: HCPCS | Performed by: INTERNAL MEDICINE

## 2022-03-29 PROCEDURE — 99213 OFFICE O/P EST LOW 20 MIN: CPT | Performed by: INTERNAL MEDICINE

## 2022-03-29 NOTE — PATIENT INSTRUCTIONS
No change in medications  Call our office with any concerning cardiac symptoms  Follow up with cardiology as needed
